# Patient Record
Sex: FEMALE | Race: WHITE | NOT HISPANIC OR LATINO
[De-identification: names, ages, dates, MRNs, and addresses within clinical notes are randomized per-mention and may not be internally consistent; named-entity substitution may affect disease eponyms.]

---

## 2021-04-19 PROBLEM — Z00.00 ENCOUNTER FOR PREVENTIVE HEALTH EXAMINATION: Status: ACTIVE | Noted: 2021-04-19

## 2021-04-23 ENCOUNTER — APPOINTMENT (OUTPATIENT)
Dept: COLORECTAL SURGERY | Facility: CLINIC | Age: 36
End: 2021-04-23
Payer: MEDICAID

## 2021-04-23 VITALS
SYSTOLIC BLOOD PRESSURE: 143 MMHG | BODY MASS INDEX: 17.03 KG/M2 | WEIGHT: 101 LBS | TEMPERATURE: 97.7 F | DIASTOLIC BLOOD PRESSURE: 93 MMHG | HEIGHT: 64.5 IN | HEART RATE: 106 BPM

## 2021-04-23 DIAGNOSIS — I10 ESSENTIAL (PRIMARY) HYPERTENSION: ICD-10-CM

## 2021-04-23 DIAGNOSIS — Z82.49 FAMILY HISTORY OF ISCHEMIC HEART DISEASE AND OTHER DISEASES OF THE CIRCULATORY SYSTEM: ICD-10-CM

## 2021-04-23 DIAGNOSIS — R19.7 DIARRHEA, UNSPECIFIED: ICD-10-CM

## 2021-04-23 DIAGNOSIS — Z86.32 PERSONAL HISTORY OF GESTATIONAL DIABETES: ICD-10-CM

## 2021-04-23 DIAGNOSIS — Z78.9 OTHER SPECIFIED HEALTH STATUS: ICD-10-CM

## 2021-04-23 DIAGNOSIS — K62.3 RECTAL PROLAPSE: ICD-10-CM

## 2021-04-23 DIAGNOSIS — Z72.3 LACK OF PHYSICAL EXERCISE: ICD-10-CM

## 2021-04-23 PROCEDURE — 99072 ADDL SUPL MATRL&STAF TM PHE: CPT

## 2021-04-23 PROCEDURE — 99203 OFFICE O/P NEW LOW 30 MIN: CPT | Mod: 25

## 2021-04-23 PROCEDURE — 46600 DIAGNOSTIC ANOSCOPY SPX: CPT

## 2021-04-23 RX ORDER — FLUTICASONE PROPIONATE 220 MCG
AEROSOL WITH ADAPTER (GRAM) INHALATION
Refills: 0 | Status: ACTIVE | COMMUNITY

## 2021-04-23 RX ORDER — UBIDECARENONE/VIT E ACET 100MG-5
CAPSULE ORAL
Refills: 0 | Status: ACTIVE | COMMUNITY

## 2021-04-23 RX ORDER — MAGNESIUM OXIDE/MAG AA CHELATE 300 MG
CAPSULE ORAL
Refills: 0 | Status: ACTIVE | COMMUNITY

## 2021-04-23 RX ORDER — MULTIVIT-MIN/IRON/FOLIC ACID/K 18-600-40
500 CAPSULE ORAL
Refills: 0 | Status: ACTIVE | COMMUNITY

## 2021-04-23 NOTE — PHYSICAL EXAM
[Normal] : was normal [None] : there was no rectal mass  [Excoriation] : no perianal excoriation [de-identified] : Mild tenderness at extra rectal/sacral coccygeal region approximately 8 cm renal verge. [FreeTextEntry1] : A lighted anoscope was passed into the anal canal and the entire anal mucosal surface was inspected..  The findings revealed minimal  internal hemorrhoids. No masses or lesions were identified.\par \par

## 2021-04-23 NOTE — ASSESSMENT
[FreeTextEntry1] : I have reviewed with the patient and her symptoms of diarrhea with intermittent incomplete evacuation are consistent with possible internal rectal prolapse and possible secondary injury from her recent pregnancy/deliveries.\par \par \par I have counseled the patient regarding the role of a MRI for further evaluation.\par \par Pending review of the studies further treatment recommendations will be forthcoming

## 2021-04-23 NOTE — HISTORY OF PRESENT ILLNESS
[FreeTextEntry1] : 34 y/o F presents for evaluation of rectal pain \par  x 5, currently breast feeding \par H/o heart murmur discovered during last pregnancy, evaluated by cardiology and reassured she does not need further work up or full-up \par \par Reports since delivery (2020) she has had severe constipation and rectal soreness\par Denies itching\par Admits to occasional episodes of BRBPR on the TP with very hard stools\par BH: 3-4 times daily. Reports only being able to expel a small amount of stool the first 2-3 times before she can completely evacuate\par Admits to straining\par Reports adequate dietary fiber intake. Currently drinking 6+ cups of water daily \par Has tried Miralax in the past but not using currently. Using an herbal supplement daily \par Has tried OTC hemorrhoid medications with minimal improvement in symptoms\par Never had a colonoscopy\par No ASA/NSAIDs last 7 days

## 2021-04-30 ENCOUNTER — NON-APPOINTMENT (OUTPATIENT)
Age: 36
End: 2021-04-30